# Patient Record
Sex: MALE | Race: OTHER | URBAN - METROPOLITAN AREA
[De-identification: names, ages, dates, MRNs, and addresses within clinical notes are randomized per-mention and may not be internally consistent; named-entity substitution may affect disease eponyms.]

---
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
## 2024-04-03 NOTE — ED PROVIDER NOTE - NS PRO PASSIVE SMOKE EXP
Likely secondary to left pectoralis strain, but will obtain x-rays  Reports subjective decrease sensation over left cheek, CTM clinically, short follow-up, low threshold for advanced imaging, referrals   No